# Patient Record
Sex: FEMALE | Race: WHITE | NOT HISPANIC OR LATINO | Employment: FULL TIME | URBAN - METROPOLITAN AREA
[De-identification: names, ages, dates, MRNs, and addresses within clinical notes are randomized per-mention and may not be internally consistent; named-entity substitution may affect disease eponyms.]

---

## 2019-05-09 PROBLEM — F41.8 DEPRESSION WITH ANXIETY: Status: ACTIVE | Noted: 2019-05-09

## 2019-05-09 PROBLEM — E66.9 CLASS 1 OBESITY WITHOUT SERIOUS COMORBIDITY WITH BODY MASS INDEX (BMI) OF 33.0 TO 33.9 IN ADULT: Status: ACTIVE | Noted: 2019-05-09

## 2019-05-09 PROBLEM — Z11.59 ENCOUNTER FOR HEPATITIS C SCREENING TEST FOR LOW RISK PATIENT: Status: ACTIVE | Noted: 2019-05-09

## 2019-05-09 PROBLEM — K21.9 GASTROESOPHAGEAL REFLUX DISEASE: Status: ACTIVE | Noted: 2019-05-09

## 2020-04-14 ENCOUNTER — TELEPHONE (OUTPATIENT)
Dept: ORTHOPEDICS | Facility: CLINIC | Age: 60
End: 2020-04-14

## 2021-05-12 ENCOUNTER — PATIENT MESSAGE (OUTPATIENT)
Dept: RESEARCH | Facility: HOSPITAL | Age: 61
End: 2021-05-12

## 2022-06-16 PROBLEM — E66.09 CLASS 1 OBESITY DUE TO EXCESS CALORIES WITH SERIOUS COMORBIDITY AND BODY MASS INDEX (BMI) OF 33.0 TO 33.9 IN ADULT: Status: ACTIVE | Noted: 2019-05-09

## 2022-06-16 PROBLEM — K31.84 GASTROPARESIS: Status: ACTIVE | Noted: 2022-06-16

## 2022-07-12 PROBLEM — R10.13 EPIGASTRIC PAIN: Status: ACTIVE | Noted: 2022-07-12

## 2022-09-21 ENCOUNTER — OFFICE VISIT (OUTPATIENT)
Dept: CARDIOLOGY | Facility: CLINIC | Age: 62
End: 2022-09-21
Payer: COMMERCIAL

## 2022-09-21 VITALS
SYSTOLIC BLOOD PRESSURE: 134 MMHG | DIASTOLIC BLOOD PRESSURE: 80 MMHG | BODY MASS INDEX: 33.88 KG/M2 | HEART RATE: 87 BPM | WEIGHT: 198.44 LBS | HEIGHT: 64 IN

## 2022-09-21 DIAGNOSIS — E78.00 HYPERCHOLESTEROLEMIA: Chronic | ICD-10-CM

## 2022-09-21 DIAGNOSIS — R09.89 RIGHT CAROTID BRUIT: ICD-10-CM

## 2022-09-21 DIAGNOSIS — Z82.49 FAMILY HISTORY OF ABDOMINAL AORTIC ANEURYSM (AAA): ICD-10-CM

## 2022-09-21 DIAGNOSIS — R01.1 SYSTOLIC MURMUR: ICD-10-CM

## 2022-09-21 DIAGNOSIS — E66.9 OBESITY, CLASS I, BMI 30-34.9: ICD-10-CM

## 2022-09-21 DIAGNOSIS — R06.09 DOE (DYSPNEA ON EXERTION): ICD-10-CM

## 2022-09-21 DIAGNOSIS — R07.2 PRECORDIAL PAIN: Primary | ICD-10-CM

## 2022-09-21 DIAGNOSIS — R53.83 OTHER FATIGUE: Chronic | ICD-10-CM

## 2022-09-21 DIAGNOSIS — Z82.49 FAMILY HISTORY OF EARLY CAD: Chronic | ICD-10-CM

## 2022-09-21 DIAGNOSIS — R03.0 ELEVATED BP WITHOUT DIAGNOSIS OF HYPERTENSION: Chronic | ICD-10-CM

## 2022-09-21 PROCEDURE — 3079F DIAST BP 80-89 MM HG: CPT | Mod: CPTII,S$GLB,, | Performed by: INTERNAL MEDICINE

## 2022-09-21 PROCEDURE — 93000 EKG 12-LEAD: ICD-10-PCS | Mod: S$GLB,,, | Performed by: INTERNAL MEDICINE

## 2022-09-21 PROCEDURE — 3008F BODY MASS INDEX DOCD: CPT | Mod: CPTII,S$GLB,, | Performed by: INTERNAL MEDICINE

## 2022-09-21 PROCEDURE — 93000 ELECTROCARDIOGRAM COMPLETE: CPT | Mod: S$GLB,,, | Performed by: INTERNAL MEDICINE

## 2022-09-21 PROCEDURE — 3044F HG A1C LEVEL LT 7.0%: CPT | Mod: CPTII,S$GLB,, | Performed by: INTERNAL MEDICINE

## 2022-09-21 PROCEDURE — 3079F PR MOST RECENT DIASTOLIC BLOOD PRESSURE 80-89 MM HG: ICD-10-PCS | Mod: CPTII,S$GLB,, | Performed by: INTERNAL MEDICINE

## 2022-09-21 PROCEDURE — 99204 PR OFFICE/OUTPT VISIT, NEW, LEVL IV, 45-59 MIN: ICD-10-PCS | Mod: S$GLB,,, | Performed by: INTERNAL MEDICINE

## 2022-09-21 PROCEDURE — 1159F PR MEDICATION LIST DOCUMENTED IN MEDICAL RECORD: ICD-10-PCS | Mod: CPTII,S$GLB,, | Performed by: INTERNAL MEDICINE

## 2022-09-21 PROCEDURE — 99204 OFFICE O/P NEW MOD 45 MIN: CPT | Mod: S$GLB,,, | Performed by: INTERNAL MEDICINE

## 2022-09-21 PROCEDURE — 3044F PR MOST RECENT HEMOGLOBIN A1C LEVEL <7.0%: ICD-10-PCS | Mod: CPTII,S$GLB,, | Performed by: INTERNAL MEDICINE

## 2022-09-21 PROCEDURE — 3075F PR MOST RECENT SYSTOLIC BLOOD PRESS GE 130-139MM HG: ICD-10-PCS | Mod: CPTII,S$GLB,, | Performed by: INTERNAL MEDICINE

## 2022-09-21 PROCEDURE — 3075F SYST BP GE 130 - 139MM HG: CPT | Mod: CPTII,S$GLB,, | Performed by: INTERNAL MEDICINE

## 2022-09-21 PROCEDURE — 1159F MED LIST DOCD IN RCRD: CPT | Mod: CPTII,S$GLB,, | Performed by: INTERNAL MEDICINE

## 2022-09-21 PROCEDURE — 3008F PR BODY MASS INDEX (BMI) DOCUMENTED: ICD-10-PCS | Mod: CPTII,S$GLB,, | Performed by: INTERNAL MEDICINE

## 2022-09-21 RX ORDER — PRAVASTATIN SODIUM 10 MG/1
10 TABLET ORAL DAILY
Qty: 90 TABLET | Refills: 0 | Status: SHIPPED | OUTPATIENT
Start: 2022-09-21 | End: 2022-12-14

## 2022-09-21 RX ORDER — PANTOPRAZOLE SODIUM 40 MG/1
TABLET, DELAYED RELEASE ORAL
COMMUNITY
Start: 2022-07-12 | End: 2023-06-01 | Stop reason: SDUPTHER

## 2022-09-21 NOTE — PROGRESS NOTES
Subjective:    Patient ID:  Vivi Dawson is a 62 y.o. female who presents for Heartburn and Chest Pain (Sting feeling in back left side)        Heartburn  She complains of chest pain (WITH STRESS). She reports no abdominal pain, no coughing, no dysphagia, no heartburn (HH), no nausea or no wheezing. Pertinent negatives include no melena.   Chest Pain   Associated symptoms include malaise/fatigue. Pertinent negatives include no abdominal pain, back pain, claudication, cough, diaphoresis, dizziness, fever, hemoptysis, irregular heartbeat, nausea, near-syncope, numbness, orthopnea, palpitations, PND, shortness of breath, syncope, vomiting or weakness.   NP EVALUATION, CP, YOUNGER BROTHER ATHLETE,  SUDDENLY  AT 57, , AUTOPSY EXTENSIVE CAD, OLDER BROTHER AAA, H/O HYPERCHOLESTEROLEMIA, ALSO DURING BLOOD DOMNATION LAST MONTH, SEE TAYLOR H/O HTN, , HBA1C/TSH  OK, SEE ROS    Past Medical History:   Diagnosis Date    Depression with anxiety 2019    Depression with anxiety     Gastroesophageal reflux disease 2019    Hypercholesterolemia 2022    Systolic murmur 2022     Past Surgical History:   Procedure Laterality Date    ANKLE FRACTURE SURGERY Right 2007    ORIF right ankle    CYST REMOVAL Right 2019    cyst removed right thigh    ESOPHAGOGASTRODUODENOSCOPY N/A 2022    Procedure: EGD (ESOPHAGOGASTRODUODENOSCOPY);  Surgeon: KB Tellez MD;  Location: [facility];  Service: Endoscopy;  Laterality: N/A;     Family History   Problem Relation Age of Onset    Cancer Father      Social History     Socioeconomic History    Marital status:    Tobacco Use    Smoking status: Never    Smokeless tobacco: Never   Substance and Sexual Activity    Alcohol use: Yes     Alcohol/week: 1.0 standard drink     Types: 1 Glasses of wine per week    Drug use: No    Sexual activity: Yes     Birth control/protection: Post-menopausal     Social Determinants of Health     Financial Resource Strain:  Low Risk     Difficulty of Paying Living Expenses: Not very hard   Food Insecurity: No Food Insecurity    Worried About Running Out of Food in the Last Year: Never true    Ran Out of Food in the Last Year: Never true   Transportation Needs: No Transportation Needs    Lack of Transportation (Medical): No    Lack of Transportation (Non-Medical): No   Physical Activity: Unknown    Days of Exercise per Week: 0 days    Minutes of Exercise per Session: Patient refused   Stress: Stress Concern Present    Feeling of Stress : Rather much   Social Connections: Unknown    Frequency of Communication with Friends and Family: More than three times a week    Frequency of Social Gatherings with Friends and Family: Once a week    Active Member of Clubs or Organizations: Yes    Attends Club or Organization Meetings: More than 4 times per year    Marital Status:    Housing Stability: Low Risk     Unable to Pay for Housing in the Last Year: No    Number of Places Lived in the Last Year: 1    Unstable Housing in the Last Year: No       Review of patient's allergies indicates:   Allergen Reactions    Codeine Nausea And Vomiting       Current Outpatient Medications:     meloxicam (MOBIC) 15 MG tablet, TAKE 1 TABLET BY MOUTH EVERY DAY, Disp: 30 tablet, Rfl: 1    metoclopramide HCl (REGLAN) 5 MG tablet, Take 1 tablet (5 mg total) by mouth 2 (two) times daily as needed (gastroparesis)., Disp: 12 tablet, Rfl: 0    ondansetron (ZOFRAN-ODT) 8 MG TbDL, Take 1 tablet (8 mg total) by mouth every 24 hours as needed (nausea)., Disp: 20 tablet, Rfl: 0    pantoprazole (PROTONIX) 40 MG tablet, TAKE 1 TABLET BY MOUTH TWICE A DAY BEFORE BREAKFAST & DINNER, Disp: , Rfl:     pravastatin (PRAVACHOL) 10 MG tablet, Take 1 tablet (10 mg total) by mouth once daily., Disp: 90 tablet, Rfl: 0    Review of Systems   Constitutional: Positive for malaise/fatigue and weight gain. Negative for chills, diaphoresis, fever and night sweats.   HENT:  Positive for  "hearing loss. Negative for congestion and nosebleeds.    Eyes:  Negative for blurred vision and visual disturbance.   Cardiovascular:  Positive for chest pain (WITH STRESS) and dyspnea on exertion (MILD). Negative for claudication, cyanosis, irregular heartbeat, leg swelling (OCC ANKLES), near-syncope, orthopnea, palpitations, paroxysmal nocturnal dyspnea and syncope.   Respiratory:  Negative for cough, hemoptysis, shortness of breath and wheezing.    Endocrine: Positive for polydipsia. Negative for heat intolerance and polyuria.   Hematologic/Lymphatic: Negative for adenopathy. Does not bruise/bleed easily.   Skin:  Negative for color change, itching and nail changes.   Musculoskeletal:  Positive for joint pain (KNEES). Negative for back pain and falls.   Gastrointestinal:  Negative for abdominal pain, change in bowel habit, dysphagia, heartburn (HH), jaundice, melena, nausea and vomiting.   Genitourinary:  Negative for dysuria and flank pain.   Neurological:  Negative for brief paralysis, dizziness, focal weakness, light-headedness, loss of balance, numbness, paresthesias, tremors and weakness.   Psychiatric/Behavioral:  Negative for altered mental status, depression and memory loss. The patient has insomnia (STARTED HORMAONAL CREAM) and is nervous/anxious.    Allergic/Immunologic: Negative for hives and persistent infections.      Objective:      Vitals:    09/21/22 1518 09/21/22 1537   BP: (!) 175/92 134/80   Pulse: 87    Weight: 90 kg (198 lb 6.6 oz)    Height: 5' 4" (1.626 m)    PainSc:   2    PainLoc: Chest      Body mass index is 34.06 kg/m².    Physical Exam  Constitutional:       Appearance: She is well-developed. She is obese.   HENT:      Head: Normocephalic and atraumatic.   Eyes:      Conjunctiva/sclera: Conjunctivae normal.      Pupils: Pupils are equal, round, and reactive to light.   Neck:      Thyroid: No thyromegaly.      Vascular: Normal carotid pulses. Carotid bruit present. No hepatojugular " reflux or JVD.      Trachea: Trachea normal. No tracheal deviation.   Cardiovascular:      Rate and Rhythm: Normal rate and regular rhythm. No extrasystoles are present.     Chest Wall: PMI is not displaced.      Pulses:           Carotid pulses are 2+ on the right side with bruit and 2+ on the left side.       Radial pulses are 2+ on the right side and 2+ on the left side.        Femoral pulses are 2+ on the right side and 2+ on the left side.       Dorsalis pedis pulses are 2+ on the right side and 2+ on the left side.        Posterior tibial pulses are 2+ on the right side and 2+ on the left side.      Heart sounds: Murmur heard.   Systolic murmur is present with a grade of 2/6 at the lower left sternal border.     No friction rub. No gallop.   Pulmonary:      Effort: Pulmonary effort is normal. No tachypnea or bradypnea.      Breath sounds: Normal breath sounds. No wheezing or rales.   Chest:      Chest wall: No tenderness.   Abdominal:      General: Bowel sounds are normal. There is no distension.      Palpations: Abdomen is soft. There is no mass.      Tenderness: There is no abdominal tenderness. There is no guarding.   Musculoskeletal:         General: Normal range of motion.      Cervical back: Neck supple. No erythema.      Right lower leg: No edema.      Left lower leg: No edema.   Skin:     General: Skin is warm and dry.      Capillary Refill: Capillary refill takes less than 2 seconds.      Coloration: Skin is not pale.      Findings: No erythema or rash.   Neurological:      General: No focal deficit present.      Mental Status: She is alert and oriented to person, place, and time.      Cranial Nerves: No cranial nerve deficit ([place]).      Motor: No tremor or abnormal muscle tone.      Coordination: Coordination normal.   Psychiatric:         Speech: Speech normal.         Behavior: Behavior normal.         Thought Content: Thought content normal.         Judgment: Judgment normal.               ..     Chemistry        Component Value Date/Time     06/16/2022 1025    K 4.2 06/16/2022 1025     06/16/2022 1025    CO2 28 06/16/2022 1025    BUN 13 06/16/2022 1025    CREATININE 0.71 06/16/2022 1025    GLU 92 06/16/2022 1025        Component Value Date/Time    CALCIUM 9.0 06/16/2022 1025    ALKPHOS 89 06/16/2022 1025    AST 28 06/16/2022 1025    ALT 23 06/16/2022 1025    BILITOT 0.3 06/16/2022 1025    ESTGFRAFRICA >60 06/16/2022 1025    EGFRNONAA >60 06/16/2022 1025            ..  Lab Results   Component Value Date    CHOL 190 06/16/2022    CHOL 181 08/24/2020    CHOL 209 (H) 05/09/2019     Lab Results   Component Value Date    HDL 51 06/16/2022    HDL 59 08/24/2020    HDL 51 05/09/2019     Lab Results   Component Value Date    LDLCALC 124.2 06/16/2022    LDLCALC 109.4 08/24/2020    LDLCALC 139.2 05/09/2019     Lab Results   Component Value Date    TRIG 74 06/16/2022    TRIG 63 08/24/2020    TRIG 94 05/09/2019     Lab Results   Component Value Date    CHOLHDL 26.8 06/16/2022    CHOLHDL 32.6 08/24/2020    CHOLHDL 24.4 05/09/2019     ..  Lab Results   Component Value Date    WBC 4.93 08/24/2020    HGB 13.8 08/24/2020    HCT 41.8 08/24/2020    MCV 89 08/24/2020     08/24/2020       Test(s) Reviewed  I have reviewed the following in detail:  [] Stress test   [] Angiography   [] Echocardiogram   [x] Labs   [x] Other:       Assessment:         ICD-10-CM ICD-9-CM   1. Precordial pain  R07.2 786.51   2. MCKEON (dyspnea on exertion)  R06.00 786.09   3. Systolic murmur  R01.1 785.2   4. Hypercholesterolemia  E78.00 272.0   5. Family history of early CAD  Z82.49 V17.3   6. Elevated BP without diagnosis of hypertension  R03.0 796.2   7. Family history of abdominal aortic aneurysm (AAA)  Z82.49 V17.49   8. Other fatigue  R53.83 780.79   9. Right carotid bruit  R09.89 785.9     Problem List Items Addressed This Visit          Cardiac/Vascular    Precordial pain - Primary    Relevant Orders    Stress Echo Which stress  agent will be used? Treadmill Exercise; Color Flow Doppler? Yes    Hypercholesterolemia    Family history of early CAD    Relevant Orders    Stress Echo Which stress agent will be used? Treadmill Exercise; Color Flow Doppler? Yes    Systolic murmur    Relevant Orders    Stress Echo Which stress agent will be used? Treadmill Exercise; Color Flow Doppler? Yes    Elevated BP without diagnosis of hypertension    Family history of abdominal aortic aneurysm (AAA)    MCKEON (dyspnea on exertion)    Relevant Orders    Stress Echo Which stress agent will be used? Treadmill Exercise; Color Flow Doppler? Yes    Right carotid bruit    Relevant Orders    CV Ultrasound Bilateral Doppler Carotid       Other    Other fatigue        Plan:     EKG WNL, WILL NEED FURTHER EVALUATION CHECK STRESS ECHO SE, CAROTIDS, WATCH BP,START PRAVACHOL IN THIS PATIENT WITH MULTIPLE RISK FACTORS, ALL CV CLINICALLY STABLE, ASSESS EQUIVALENT ANGINA, NO HF, NO TIA, NO CLINICAL ARRHYTHMIA,CONTINUE CURRENT MEDS, EDUCATION, DIET, EXERCISE, WEIGHT LOSS RETURN TO CLINIC IN FEW WEEKS AFTER TESTS      Precordial pain  Comments:  EVALUATE  Orders:  -     Stress Echo Which stress agent will be used? Treadmill Exercise; Color Flow Doppler? Yes; Future    MCKEON (dyspnea on exertion)  -     Stress Echo Which stress agent will be used? Treadmill Exercise; Color Flow Doppler? Yes; Future  -     IN OFFICE EKG 12-LEAD (to Muse)    Systolic murmur  -     Stress Echo Which stress agent will be used? Treadmill Exercise; Color Flow Doppler? Yes; Future    Hypercholesterolemia    Family history of early CAD  -     Stress Echo Which stress agent will be used? Treadmill Exercise; Color Flow Doppler? Yes; Future    Elevated BP without diagnosis of hypertension    Family history of abdominal aortic aneurysm (AAA)    Other fatigue    Right carotid bruit  Comments:  CAROTID ULTRASOUND  Orders:  -     CV Ultrasound Bilateral Doppler Carotid; Future    Other orders  -     pravastatin  (PRAVACHOL) 10 MG tablet; Take 1 tablet (10 mg total) by mouth once daily.  Dispense: 90 tablet; Refill: 0  RTC Low level/low impact aerobic exercise 5x's/wk. Heart healthy diet and risk factor modification.    See labs and med orders.    Aerobic exercise 5x's/wk. Heart healthy diet and risk factor modification.    See labs and med orders.

## 2022-09-22 DIAGNOSIS — R06.09 DOE (DYSPNEA ON EXERTION): Primary | ICD-10-CM

## 2022-09-22 PROBLEM — E66.9 OBESITY, CLASS I, BMI 30-34.9: Status: ACTIVE | Noted: 2022-09-22

## 2022-10-05 ENCOUNTER — CLINICAL SUPPORT (OUTPATIENT)
Dept: CARDIOLOGY | Facility: HOSPITAL | Age: 62
End: 2022-10-05
Attending: INTERNAL MEDICINE
Payer: COMMERCIAL

## 2022-10-05 VITALS
DIASTOLIC BLOOD PRESSURE: 92 MMHG | WEIGHT: 198 LBS | SYSTOLIC BLOOD PRESSURE: 171 MMHG | HEIGHT: 64 IN | BODY MASS INDEX: 33.8 KG/M2

## 2022-10-05 DIAGNOSIS — R09.89 RIGHT CAROTID BRUIT: ICD-10-CM

## 2022-10-05 DIAGNOSIS — R06.09 DOE (DYSPNEA ON EXERTION): ICD-10-CM

## 2022-10-05 DIAGNOSIS — Z82.49 FAMILY HISTORY OF EARLY CAD: ICD-10-CM

## 2022-10-05 DIAGNOSIS — R07.2 PRECORDIAL PAIN: ICD-10-CM

## 2022-10-05 DIAGNOSIS — R01.1 SYSTOLIC MURMUR: ICD-10-CM

## 2022-10-05 LAB
ASCENDING AORTA: 2.71 CM
AV INDEX (PROSTH): 0.74
AV MEAN GRADIENT: 7 MMHG
AV PEAK GRADIENT: 13 MMHG
AV VALVE AREA: 2.04 CM2
AV VELOCITY RATIO: 0.7
BSA FOR ECHO PROCEDURE: 2.01 M2
CV ECHO LV RWT: 0.38 CM
CV STRESS BASE HR: 91 BPM
DIASTOLIC BLOOD PRESSURE: 92 MMHG
DOP CALC AO PEAK VEL: 1.78 M/S
DOP CALC AO VTI: 37.6 CM
DOP CALC LVOT AREA: 2.7 CM2
DOP CALC LVOT DIAMETER: 1.87 CM
DOP CALC LVOT PEAK VEL: 1.24 M/S
DOP CALC LVOT STROKE VOLUME: 76.86 CM3
DOP CALCLVOT PEAK VEL VTI: 28 CM
E WAVE DECELERATION TIME: 195.4 MSEC
E/A RATIO: 1.27
E/E' RATIO: 15.57 M/S
ECHO LV POSTERIOR WALL: 0.88 CM (ref 0.6–1.1)
EJECTION FRACTION: 65 %
FRACTIONAL SHORTENING: 39 % (ref 28–44)
INTERVENTRICULAR SEPTUM: 0.82 CM (ref 0.6–1.1)
IVRT: 79.92 MSEC
LA MAJOR: 4.62 CM
LA MINOR: 4.28 CM
LA WIDTH: 3.9 CM
LEFT ARM DIASTOLIC BLOOD PRESSURE: 92 MMHG
LEFT ARM SYSTOLIC BLOOD PRESSURE: 171 MMHG
LEFT ATRIUM SIZE: 3.29 CM
LEFT ATRIUM VOLUME INDEX: 24.9 ML/M2
LEFT ATRIUM VOLUME: 48.46 CM3
LEFT CBA DIAS: 21 CM/S
LEFT CBA SYS: 64 CM/S
LEFT CCA DIST DIAS: 24 CM/S
LEFT CCA DIST SYS: 88 CM/S
LEFT CCA MID DIAS: 28 CM/S
LEFT CCA MID SYS: 108 CM/S
LEFT CCA PROX DIAS: 22 CM/S
LEFT CCA PROX SYS: 98 CM/S
LEFT ECA DIAS: 18 CM/S
LEFT ECA SYS: 90 CM/S
LEFT ICA DIST DIAS: 43 CM/S
LEFT ICA DIST SYS: 93 CM/S
LEFT ICA MID DIAS: 32 CM/S
LEFT ICA MID SYS: 84 CM/S
LEFT ICA PROX DIAS: 28 CM/S
LEFT ICA PROX SYS: 71 CM/S
LEFT INTERNAL DIMENSION IN SYSTOLE: 2.87 CM (ref 2.1–4)
LEFT VENTRICLE DIASTOLIC VOLUME INDEX: 52.26 ML/M2
LEFT VENTRICLE DIASTOLIC VOLUME: 101.91 ML
LEFT VENTRICLE MASS INDEX: 68 G/M2
LEFT VENTRICLE SYSTOLIC VOLUME INDEX: 16.1 ML/M2
LEFT VENTRICLE SYSTOLIC VOLUME: 31.45 ML
LEFT VENTRICULAR INTERNAL DIMENSION IN DIASTOLE: 4.69 CM (ref 3.5–6)
LEFT VENTRICULAR MASS: 131.85 G
LEFT VERTEBRAL DIAS: 13 CM/S
LEFT VERTEBRAL SYS: 45 CM/S
LV LATERAL E/E' RATIO: 15.57 M/S
LV SEPTAL E/E' RATIO: 15.57 M/S
LVOT MG: 3.38 MMHG
LVOT MV: 0.85 CM/S
MV PEAK A VEL: 0.86 M/S
MV PEAK E VEL: 1.09 M/S
MV STENOSIS PRESSURE HALF TIME: 56.67 MS
MV VALVE AREA P 1/2 METHOD: 3.88 CM2
OHS CV CAROTID RIGHT ICA EDV HIGHEST: 39
OHS CV CAROTID ULTRASOUND LEFT ICA/CCA RATIO: 1.06
OHS CV CAROTID ULTRASOUND RIGHT ICA/CCA RATIO: 0.94
OHS CV CPX 1 MINUTE RECOVERY HEART RATE: 120 BPM
OHS CV CPX 85 PERCENT MAX PREDICTED HEART RATE MALE: 130
OHS CV CPX ESTIMATED METS: 9
OHS CV CPX MAX PREDICTED HEART RATE: 153
OHS CV CPX PATIENT IS FEMALE: 1
OHS CV CPX PATIENT IS MALE: 0
OHS CV CPX PEAK DIASTOLIC BLOOD PRESSURE: 86 MMHG
OHS CV CPX PEAK HEAR RATE: 160 BPM
OHS CV CPX PEAK RATE PRESSURE PRODUCT: NORMAL
OHS CV CPX PEAK SYSTOLIC BLOOD PRESSURE: 211 MMHG
OHS CV CPX PERCENT MAX PREDICTED HEART RATE ACHIEVED: 104
OHS CV CPX RATE PRESSURE PRODUCT PRESENTING: NORMAL
OHS CV PV CAROTID LEFT HIGHEST CCA: 108
OHS CV PV CAROTID LEFT HIGHEST ICA: 93
OHS CV PV CAROTID RIGHT HIGHEST CCA: 113
OHS CV PV CAROTID RIGHT HIGHEST ICA: 99
OHS CV US CAROTID LEFT HIGHEST EDV: 43
PISA TR MAX VEL: 2.39 M/S
PULM VEIN S/D RATIO: 1.21
PV PEAK D VEL: 0.58 M/S
PV PEAK S VEL: 0.7 M/S
RA MAJOR: 3.91 CM
RA PRESSURE: 3 MMHG
RA WIDTH: 2.91 CM
RIGHT ARM DIASTOLIC BLOOD PRESSURE: 92 MMHG
RIGHT ARM SYSTOLIC BLOOD PRESSURE: 171 MMHG
RIGHT CBA DIAS: 18 CM/S
RIGHT CBA SYS: 68 CM/S
RIGHT CCA DIST DIAS: 31 CM/S
RIGHT CCA DIST SYS: 105 CM/S
RIGHT CCA MID DIAS: 33 CM/S
RIGHT CCA MID SYS: 113 CM/S
RIGHT CCA PROX DIAS: 20 CM/S
RIGHT CCA PROX SYS: 90 CM/S
RIGHT ECA DIAS: 24 CM/S
RIGHT ECA SYS: 121 CM/S
RIGHT ICA DIST DIAS: 39 CM/S
RIGHT ICA DIST SYS: 99 CM/S
RIGHT ICA MID DIAS: 28 CM/S
RIGHT ICA MID SYS: 74 CM/S
RIGHT ICA PROX DIAS: 23 CM/S
RIGHT ICA PROX SYS: 67 CM/S
RIGHT VENTRICULAR END-DIASTOLIC DIMENSION: 3.4 CM
RIGHT VENTRICULAR LENGTH IN DIASTOLE (APICAL 4-CHAMBER VIEW): 6.22 CM
RIGHT VERTEBRAL DIAS: 16 CM/S
RIGHT VERTEBRAL SYS: 49 CM/S
RV MID DIAMA: 2.23 CM
RV TISSUE DOPPLER FREE WALL SYSTOLIC VELOCITY 1 (APICAL 4 CHAMBER VIEW): 0.02 CM/S
SINUS: 2.79 CM
STJ: 2.46 CM
STRESS ECHO POST EXERCISE DUR MIN: 5 MINUTES
STRESS ECHO POST EXERCISE DUR SEC: 9 SECONDS
SYSTOLIC BLOOD PRESSURE: 171 MMHG
TDI LATERAL: 0.07 M/S
TDI SEPTAL: 0.07 M/S
TDI: 0.07 M/S
TR MAX PG: 23 MMHG
TRICUSPID ANNULAR PLANE SYSTOLIC EXCURSION: 2.45 CM
TV REST PULMONARY ARTERY PRESSURE: 26 MMHG

## 2022-10-05 PROCEDURE — 93351 STRESS TTE COMPLETE: CPT | Mod: PO

## 2022-10-05 PROCEDURE — 93325 DOPPLER ECHO COLOR FLOW MAPG: CPT | Mod: 26,,, | Performed by: INTERNAL MEDICINE

## 2022-10-05 PROCEDURE — 93320 DOPPLER ECHO COMPLETE: CPT | Mod: 26,,, | Performed by: INTERNAL MEDICINE

## 2022-10-05 PROCEDURE — 93880 EXTRACRANIAL BILAT STUDY: CPT | Mod: 26,,, | Performed by: INTERNAL MEDICINE

## 2022-10-05 PROCEDURE — 93320 STRESS ECHO (CUPID ONLY): ICD-10-PCS | Mod: 26,,, | Performed by: INTERNAL MEDICINE

## 2022-10-05 PROCEDURE — 93325 STRESS ECHO (CUPID ONLY): ICD-10-PCS | Mod: 26,,, | Performed by: INTERNAL MEDICINE

## 2022-10-05 PROCEDURE — 93880 EXTRACRANIAL BILAT STUDY: CPT | Mod: PO

## 2022-10-05 PROCEDURE — 93351 STRESS TTE COMPLETE: CPT | Mod: 26,,, | Performed by: INTERNAL MEDICINE

## 2022-10-05 PROCEDURE — 93880 CV US DOPPLER CAROTID (CUPID ONLY): ICD-10-PCS | Mod: 26,,, | Performed by: INTERNAL MEDICINE

## 2022-10-05 PROCEDURE — 93351 STRESS ECHO (CUPID ONLY): ICD-10-PCS | Mod: 26,,, | Performed by: INTERNAL MEDICINE

## 2022-11-09 ENCOUNTER — OFFICE VISIT (OUTPATIENT)
Dept: CARDIOLOGY | Facility: CLINIC | Age: 62
End: 2022-11-09
Payer: COMMERCIAL

## 2022-11-09 VITALS
BODY MASS INDEX: 33.88 KG/M2 | HEART RATE: 73 BPM | WEIGHT: 198.44 LBS | DIASTOLIC BLOOD PRESSURE: 76 MMHG | HEIGHT: 64 IN | SYSTOLIC BLOOD PRESSURE: 136 MMHG

## 2022-11-09 DIAGNOSIS — E78.00 HYPERCHOLESTEROLEMIA: Primary | Chronic | ICD-10-CM

## 2022-11-09 DIAGNOSIS — I34.0 NONRHEUMATIC MITRAL VALVE REGURGITATION: ICD-10-CM

## 2022-11-09 DIAGNOSIS — E66.9 OBESITY, CLASS I, BMI 30-34.9: Chronic | ICD-10-CM

## 2022-11-09 DIAGNOSIS — R03.0 ELEVATED BP WITHOUT DIAGNOSIS OF HYPERTENSION: Chronic | ICD-10-CM

## 2022-11-09 PROCEDURE — 3008F BODY MASS INDEX DOCD: CPT | Mod: CPTII,S$GLB,, | Performed by: INTERNAL MEDICINE

## 2022-11-09 PROCEDURE — 3078F DIAST BP <80 MM HG: CPT | Mod: CPTII,S$GLB,, | Performed by: INTERNAL MEDICINE

## 2022-11-09 PROCEDURE — 3075F SYST BP GE 130 - 139MM HG: CPT | Mod: CPTII,S$GLB,, | Performed by: INTERNAL MEDICINE

## 2022-11-09 PROCEDURE — 3044F HG A1C LEVEL LT 7.0%: CPT | Mod: CPTII,S$GLB,, | Performed by: INTERNAL MEDICINE

## 2022-11-09 PROCEDURE — 3075F PR MOST RECENT SYSTOLIC BLOOD PRESS GE 130-139MM HG: ICD-10-PCS | Mod: CPTII,S$GLB,, | Performed by: INTERNAL MEDICINE

## 2022-11-09 PROCEDURE — 3044F PR MOST RECENT HEMOGLOBIN A1C LEVEL <7.0%: ICD-10-PCS | Mod: CPTII,S$GLB,, | Performed by: INTERNAL MEDICINE

## 2022-11-09 PROCEDURE — 99214 OFFICE O/P EST MOD 30 MIN: CPT | Mod: S$GLB,,, | Performed by: INTERNAL MEDICINE

## 2022-11-09 PROCEDURE — 3008F PR BODY MASS INDEX (BMI) DOCUMENTED: ICD-10-PCS | Mod: CPTII,S$GLB,, | Performed by: INTERNAL MEDICINE

## 2022-11-09 PROCEDURE — 1159F MED LIST DOCD IN RCRD: CPT | Mod: CPTII,S$GLB,, | Performed by: INTERNAL MEDICINE

## 2022-11-09 PROCEDURE — 1159F PR MEDICATION LIST DOCUMENTED IN MEDICAL RECORD: ICD-10-PCS | Mod: CPTII,S$GLB,, | Performed by: INTERNAL MEDICINE

## 2022-11-09 PROCEDURE — 3078F PR MOST RECENT DIASTOLIC BLOOD PRESSURE < 80 MM HG: ICD-10-PCS | Mod: CPTII,S$GLB,, | Performed by: INTERNAL MEDICINE

## 2022-11-09 PROCEDURE — 99214 PR OFFICE/OUTPT VISIT, EST, LEVL IV, 30-39 MIN: ICD-10-PCS | Mod: S$GLB,,, | Performed by: INTERNAL MEDICINE

## 2022-11-09 NOTE — PROGRESS NOTES
Subjective:    Patient ID:  Vivi Dawson is a 62 y.o. female who presents for Follow-up and Hyperlipidemia        HPI  DISCUSSED TESTS NORMAL CAROTID ULTRASOUND, NORMAL LV FUNCTION WITH NEGATIVE STRESS ECHO MILD MR, DOING OK, FATIGUE, BETTER WITH HORMONE CREAM WAS SLEEPING TIRED, BP OK, SEE ROS    Past Medical History:   Diagnosis Date    Depression with anxiety 5/9/2019    Depression with anxiety     Gastroesophageal reflux disease 5/9/2019    Hypercholesterolemia 9/21/2022    Systolic murmur 9/21/2022     Past Surgical History:   Procedure Laterality Date    ANKLE FRACTURE SURGERY Right 12/26/2007    ORIF right ankle    CYST REMOVAL Right 07/2019    cyst removed right thigh    ESOPHAGOGASTRODUODENOSCOPY N/A 7/12/2022    Procedure: EGD (ESOPHAGOGASTRODUODENOSCOPY);  Surgeon: KB Tellez MD;  Location: [facility];  Service: Endoscopy;  Laterality: N/A;     Family History   Problem Relation Age of Onset    Cancer Father      Social History     Socioeconomic History    Marital status:    Tobacco Use    Smoking status: Never    Smokeless tobacco: Never   Substance and Sexual Activity    Alcohol use: Yes     Alcohol/week: 1.0 standard drink     Types: 1 Glasses of wine per week    Drug use: No    Sexual activity: Yes     Birth control/protection: Post-menopausal     Social Determinants of Health     Financial Resource Strain: Low Risk     Difficulty of Paying Living Expenses: Not very hard   Food Insecurity: No Food Insecurity    Worried About Running Out of Food in the Last Year: Never true    Ran Out of Food in the Last Year: Never true   Transportation Needs: No Transportation Needs    Lack of Transportation (Medical): No    Lack of Transportation (Non-Medical): No   Physical Activity: Inactive    Days of Exercise per Week: 0 days    Minutes of Exercise per Session: 0 min   Stress: Stress Concern Present    Feeling of Stress : To some extent   Social Connections: Unknown    Frequency of Communication  with Friends and Family: Twice a week    Frequency of Social Gatherings with Friends and Family: Once a week    Active Member of Clubs or Organizations: Yes    Attends Club or Organization Meetings: More than 4 times per year    Marital Status:    Housing Stability: Low Risk     Unable to Pay for Housing in the Last Year: No    Number of Places Lived in the Last Year: 1    Unstable Housing in the Last Year: No       Review of patient's allergies indicates:   Allergen Reactions    Codeine Nausea And Vomiting       Current Outpatient Medications:     meloxicam (MOBIC) 15 MG tablet, TAKE 1 TABLET BY MOUTH EVERY DAY, Disp: 30 tablet, Rfl: 1    ondansetron (ZOFRAN-ODT) 8 MG TbDL, Take 1 tablet (8 mg total) by mouth every 24 hours as needed (nausea)., Disp: 20 tablet, Rfl: 0    pantoprazole (PROTONIX) 40 MG tablet, TAKE 1 TABLET BY MOUTH TWICE A DAY BEFORE BREAKFAST & DINNER, Disp: , Rfl:     pravastatin (PRAVACHOL) 10 MG tablet, Take 1 tablet (10 mg total) by mouth once daily., Disp: 90 tablet, Rfl: 0    Review of Systems   Constitutional: Positive for malaise/fatigue. Negative for chills, diaphoresis, fever, night sweats and weight gain.   HENT:  Negative for congestion and nosebleeds.    Eyes:  Negative for blurred vision and visual disturbance.   Cardiovascular:  Negative for chest pain, claudication, cyanosis, dyspnea on exertion (MINIMAL), irregular heartbeat, leg swelling, near-syncope, orthopnea, palpitations, paroxysmal nocturnal dyspnea and syncope.   Respiratory:  Negative for cough, hemoptysis, shortness of breath and wheezing.    Endocrine: Negative for polydipsia and polyuria.   Hematologic/Lymphatic: Negative for adenopathy. Does not bruise/bleed easily.   Skin:  Negative for color change and itching.   Musculoskeletal:  Negative for back pain, falls and joint pain (KNEES).   Gastrointestinal:  Negative for abdominal pain, change in bowel habit, dysphagia, heartburn (HH), melena, nausea and vomiting.  "  Genitourinary:  Negative for dysuria and flank pain.   Neurological:  Negative for brief paralysis, dizziness, focal weakness, light-headedness, loss of balance and weakness.   Psychiatric/Behavioral:  Negative for altered mental status, depression and memory loss. The patient has insomnia.       Objective:      Vitals:    11/09/22 1135 11/09/22 1146   BP: (!) 140/85 136/76   Pulse: 73    Weight: 90 kg (198 lb 6.6 oz)    Height: 5' 4" (1.626 m)    PainSc: 0-No pain      Body mass index is 34.06 kg/m².    Physical Exam  Constitutional:       Appearance: She is well-developed. She is obese.   HENT:      Head: Normocephalic and atraumatic.   Eyes:      General: No scleral icterus.     Extraocular Movements: Extraocular movements intact.   Neck:      Vascular: Normal carotid pulses. No JVD.   Cardiovascular:      Rate and Rhythm: Normal rate and regular rhythm. No extrasystoles are present.     Chest Wall: PMI is not displaced.      Pulses:           Carotid pulses are 2+ on the right side and 2+ on the left side.       Radial pulses are 2+ on the right side and 2+ on the left side.        Posterior tibial pulses are 2+ on the right side and 2+ on the left side.      Heart sounds: Murmur heard.   Systolic murmur is present with a grade of 2/6 at the lower left sternal border.     No friction rub. No gallop.   Pulmonary:      Effort: Pulmonary effort is normal. No tachypnea or bradypnea.      Breath sounds: Normal breath sounds. No rales.   Abdominal:      General: Bowel sounds are normal.      Palpations: Abdomen is soft.      Tenderness: There is no abdominal tenderness.   Musculoskeletal:         General: Normal range of motion.      Cervical back: Neck supple.      Right lower leg: No edema.      Left lower leg: No edema.   Skin:     General: Skin is warm and dry.      Capillary Refill: Capillary refill takes less than 2 seconds.   Neurological:      General: No focal deficit present.      Mental Status: She is " alert and oriented to person, place, and time.      Motor: No tremor or abnormal muscle tone.   Psychiatric:         Mood and Affect: Mood normal.         Speech: Speech normal.         Behavior: Behavior normal.               ..    Chemistry        Component Value Date/Time     06/16/2022 1025    K 4.2 06/16/2022 1025     06/16/2022 1025    CO2 28 06/16/2022 1025    BUN 13 06/16/2022 1025    CREATININE 0.71 06/16/2022 1025    GLU 92 06/16/2022 1025        Component Value Date/Time    CALCIUM 9.0 06/16/2022 1025    ALKPHOS 89 06/16/2022 1025    AST 28 06/16/2022 1025    ALT 23 06/16/2022 1025    BILITOT 0.3 06/16/2022 1025    ESTGFRAFRICA >60 06/16/2022 1025    EGFRNONAA >60 06/16/2022 1025            ..  Lab Results   Component Value Date    CHOL 190 06/16/2022    CHOL 181 08/24/2020    CHOL 209 (H) 05/09/2019     Lab Results   Component Value Date    HDL 51 06/16/2022    HDL 59 08/24/2020    HDL 51 05/09/2019     Lab Results   Component Value Date    LDLCALC 124.2 06/16/2022    LDLCALC 109.4 08/24/2020    LDLCALC 139.2 05/09/2019     Lab Results   Component Value Date    TRIG 74 06/16/2022    TRIG 63 08/24/2020    TRIG 94 05/09/2019     Lab Results   Component Value Date    CHOLHDL 26.8 06/16/2022    CHOLHDL 32.6 08/24/2020    CHOLHDL 24.4 05/09/2019     ..  Lab Results   Component Value Date    WBC 4.93 08/24/2020    HGB 13.8 08/24/2020    HCT 41.8 08/24/2020    MCV 89 08/24/2020     08/24/2020       Test(s) Reviewed  I have reviewed the following in detail:  [x] Stress test   [] Angiography   [x] Echocardiogram   [] Labs   [x] Other:       Assessment:         ICD-10-CM ICD-9-CM   1. Hypercholesterolemia  E78.00 272.0   2. Nonrheumatic mitral valve regurgitation  I34.0 424.0   3. Elevated BP without diagnosis of hypertension  R03.0 796.2   4. Obesity, Class I, BMI 30-34.9  E66.9 278.00     Problem List Items Addressed This Visit          Cardiac/Vascular    Hypercholesterolemia - Primary     Relevant Orders    Comprehensive Metabolic Panel    Lipid Panel    Elevated BP without diagnosis of hypertension    Relevant Orders    Comprehensive Metabolic Panel    Nonrheumatic mitral valve regurgitation       Endocrine    Obesity, Class I, BMI 30-34.9        Plan:     WATCH BP, AFTERLOAD REDUCTION,ALL CV CLINICALLY STABLE, NO ANGINA, NO HF, NO TIA, NO CLINICAL ARRHYTHMIA,CONTINUE CURRENT MEDS, EDUCATION, DIET, EXERCISE , WEIGHT LOSS , OKAY FOR OZEMPIC IF QUALIFIES DISCUSSED WITH THE PATIENT RETURN TO CLINIC IN 6 MO WITH LABS      Hypercholesterolemia  -     Comprehensive Metabolic Panel; Future; Expected date: 05/09/2023  -     Lipid Panel; Future; Expected date: 05/09/2023    Nonrheumatic mitral valve regurgitation  Comments:  MONITOR MILD    Elevated BP without diagnosis of hypertension  Comments:  MONITOR  Orders:  -     Comprehensive Metabolic Panel; Future; Expected date: 05/09/2023    Obesity, Class I, BMI 30-34.9  RTC Low level/low impact aerobic exercise 5x's/wk. Heart healthy diet and risk factor modification.    See labs and med orders.    Aerobic exercise 5x's/wk. Heart healthy diet and risk factor modification.    See labs and med orders.

## 2023-03-17 ENCOUNTER — PATIENT MESSAGE (OUTPATIENT)
Dept: RESEARCH | Facility: HOSPITAL | Age: 63
End: 2023-03-17
Payer: COMMERCIAL

## 2023-05-11 ENCOUNTER — LAB VISIT (OUTPATIENT)
Dept: PRIMARY CARE CLINIC | Facility: CLINIC | Age: 63
End: 2023-05-11
Payer: COMMERCIAL

## 2023-05-11 DIAGNOSIS — R03.0 ELEVATED BP WITHOUT DIAGNOSIS OF HYPERTENSION: Chronic | ICD-10-CM

## 2023-05-11 DIAGNOSIS — E78.00 HYPERCHOLESTEROLEMIA: Chronic | ICD-10-CM

## 2023-05-11 LAB
ALBUMIN SERPL BCP-MCNC: 4.1 G/DL (ref 3.5–5.2)
ALP SERPL-CCNC: 88 U/L (ref 55–135)
ALT SERPL W/O P-5'-P-CCNC: 15 U/L (ref 10–44)
ANION GAP SERPL CALC-SCNC: 12 MMOL/L (ref 8–16)
AST SERPL-CCNC: 16 U/L (ref 10–40)
BILIRUB SERPL-MCNC: 0.4 MG/DL (ref 0.1–1)
BUN SERPL-MCNC: 12 MG/DL (ref 6–20)
CALCIUM SERPL-MCNC: 9.8 MG/DL (ref 8.7–10.5)
CHLORIDE SERPL-SCNC: 107 MMOL/L (ref 95–110)
CHOLEST SERPL-MCNC: 187 MG/DL (ref 120–199)
CHOLEST/HDLC SERPL: 3.7 {RATIO} (ref 2–5)
CO2 SERPL-SCNC: 23 MMOL/L (ref 23–29)
CREAT SERPL-MCNC: 0.7 MG/DL (ref 0.5–1.4)
EST. GFR  (NO RACE VARIABLE): >60 ML/MIN/1.73 M^2
GLUCOSE SERPL-MCNC: 83 MG/DL (ref 70–110)
HDLC SERPL-MCNC: 51 MG/DL (ref 40–75)
HDLC SERPL: 27.3 % (ref 20–50)
LDLC SERPL CALC-MCNC: 124 MG/DL (ref 63–159)
NONHDLC SERPL-MCNC: 136 MG/DL
POTASSIUM SERPL-SCNC: 4.4 MMOL/L (ref 3.5–5.1)
PROT SERPL-MCNC: 6.8 G/DL (ref 6–8.4)
SODIUM SERPL-SCNC: 142 MMOL/L (ref 136–145)
TRIGL SERPL-MCNC: 60 MG/DL (ref 30–150)

## 2023-05-11 PROCEDURE — 80053 COMPREHEN METABOLIC PANEL: CPT | Performed by: INTERNAL MEDICINE

## 2023-05-11 PROCEDURE — 80061 LIPID PANEL: CPT | Performed by: INTERNAL MEDICINE

## 2023-05-17 ENCOUNTER — OFFICE VISIT (OUTPATIENT)
Dept: CARDIOLOGY | Facility: CLINIC | Age: 63
End: 2023-05-17
Payer: COMMERCIAL

## 2023-05-17 VITALS
DIASTOLIC BLOOD PRESSURE: 72 MMHG | BODY MASS INDEX: 33.64 KG/M2 | SYSTOLIC BLOOD PRESSURE: 132 MMHG | WEIGHT: 197.06 LBS | HEIGHT: 64 IN | HEART RATE: 72 BPM

## 2023-05-17 DIAGNOSIS — R03.0 SITUATIONAL HYPERTENSION: ICD-10-CM

## 2023-05-17 DIAGNOSIS — E66.9 OBESITY, CLASS I, BMI 30-34.9: Chronic | ICD-10-CM

## 2023-05-17 DIAGNOSIS — E78.00 HYPERCHOLESTEROLEMIA: Chronic | ICD-10-CM

## 2023-05-17 DIAGNOSIS — T50.905A DRUG SIDE EFFECTS, INITIAL ENCOUNTER: ICD-10-CM

## 2023-05-17 DIAGNOSIS — Z82.49 FAMILY HISTORY OF EARLY CAD: Chronic | ICD-10-CM

## 2023-05-17 DIAGNOSIS — I34.0 NONRHEUMATIC MITRAL VALVE REGURGITATION: Primary | Chronic | ICD-10-CM

## 2023-05-17 PROBLEM — R09.89 RIGHT CAROTID BRUIT: Status: RESOLVED | Noted: 2022-09-21 | Resolved: 2023-05-17

## 2023-05-17 PROCEDURE — 3075F PR MOST RECENT SYSTOLIC BLOOD PRESS GE 130-139MM HG: ICD-10-PCS | Mod: CPTII,S$GLB,, | Performed by: INTERNAL MEDICINE

## 2023-05-17 PROCEDURE — 1159F PR MEDICATION LIST DOCUMENTED IN MEDICAL RECORD: ICD-10-PCS | Mod: CPTII,S$GLB,, | Performed by: INTERNAL MEDICINE

## 2023-05-17 PROCEDURE — 3078F DIAST BP <80 MM HG: CPT | Mod: CPTII,S$GLB,, | Performed by: INTERNAL MEDICINE

## 2023-05-17 PROCEDURE — 3008F BODY MASS INDEX DOCD: CPT | Mod: CPTII,S$GLB,, | Performed by: INTERNAL MEDICINE

## 2023-05-17 PROCEDURE — 3078F PR MOST RECENT DIASTOLIC BLOOD PRESSURE < 80 MM HG: ICD-10-PCS | Mod: CPTII,S$GLB,, | Performed by: INTERNAL MEDICINE

## 2023-05-17 PROCEDURE — 99214 PR OFFICE/OUTPT VISIT, EST, LEVL IV, 30-39 MIN: ICD-10-PCS | Mod: S$GLB,,, | Performed by: INTERNAL MEDICINE

## 2023-05-17 PROCEDURE — 3075F SYST BP GE 130 - 139MM HG: CPT | Mod: CPTII,S$GLB,, | Performed by: INTERNAL MEDICINE

## 2023-05-17 PROCEDURE — 3008F PR BODY MASS INDEX (BMI) DOCUMENTED: ICD-10-PCS | Mod: CPTII,S$GLB,, | Performed by: INTERNAL MEDICINE

## 2023-05-17 PROCEDURE — 1159F MED LIST DOCD IN RCRD: CPT | Mod: CPTII,S$GLB,, | Performed by: INTERNAL MEDICINE

## 2023-05-17 PROCEDURE — 99214 OFFICE O/P EST MOD 30 MIN: CPT | Mod: S$GLB,,, | Performed by: INTERNAL MEDICINE

## 2023-05-17 RX ORDER — EZETIMIBE 10 MG/1
10 TABLET ORAL DAILY
Qty: 90 TABLET | Refills: 1 | Status: SHIPPED | OUTPATIENT
Start: 2023-05-17 | End: 2023-06-01

## 2023-05-17 NOTE — PROGRESS NOTES
Subjective:    Patient ID:  Vivi Dawson is a 62 y.o. female who presents for Hyperlipidemia and Follow-up        Hyperlipidemia  Pertinent negatives include no chest pain, focal weakness or shortness of breath.   Follow-up  Pertinent negatives include no abdominal pain, change in bowel habit, chest pain, chills, congestion, coughing, diaphoresis, fever, nausea, vomiting or weakness.     DISCUSSED LABS AND GOALS CMP WNL, , HDL 51, TG 60, NOT TAKING PRAVACHOL DAILY B/O DIARRHEA AND ABD PAIN, NO CHEST PAIN NO SHORTNESS OF BREATH NO TIA TYPE SYMPTOMS NO NEAR-SYNCOPE, SEE REVIEW OF SYSTEMS  Past Medical History:   Diagnosis Date    Depression with anxiety 5/9/2019    Depression with anxiety     Gastroesophageal reflux disease 5/9/2019    Hypercholesterolemia 9/21/2022    Situational hypertension 5/17/2023    Systolic murmur 9/21/2022     Past Surgical History:   Procedure Laterality Date    ANKLE FRACTURE SURGERY Right 12/26/2007    ORIF right ankle    CYST REMOVAL Right 07/2019    cyst removed right thigh    ESOPHAGOGASTRODUODENOSCOPY N/A 7/12/2022    Procedure: EGD (ESOPHAGOGASTRODUODENOSCOPY);  Surgeon: KB Tellez MD;  Location: [facility];  Service: Endoscopy;  Laterality: N/A;     Family History   Problem Relation Age of Onset    Cancer Father      Social History     Socioeconomic History    Marital status:    Tobacco Use    Smoking status: Never    Smokeless tobacco: Never   Substance and Sexual Activity    Alcohol use: Yes     Alcohol/week: 1.0 standard drink     Types: 1 Glasses of wine per week    Drug use: No    Sexual activity: Yes     Birth control/protection: Post-menopausal     Social Determinants of Health     Financial Resource Strain: Low Risk     Difficulty of Paying Living Expenses: Not very hard   Food Insecurity: No Food Insecurity    Worried About Running Out of Food in the Last Year: Never true    Ran Out of Food in the Last Year: Never true   Transportation Needs: No  Transportation Needs    Lack of Transportation (Medical): No    Lack of Transportation (Non-Medical): No   Physical Activity: Inactive    Days of Exercise per Week: 0 days    Minutes of Exercise per Session: 0 min   Stress: Stress Concern Present    Feeling of Stress : Rather much   Social Connections: Unknown    Frequency of Communication with Friends and Family: More than three times a week    Frequency of Social Gatherings with Friends and Family: Once a week    Active Member of Clubs or Organizations: Yes    Attends Club or Organization Meetings: More than 4 times per year    Marital Status:    Housing Stability: Low Risk     Unable to Pay for Housing in the Last Year: No    Number of Places Lived in the Last Year: 1    Unstable Housing in the Last Year: No       Review of patient's allergies indicates:   Allergen Reactions    Codeine Nausea And Vomiting       Current Outpatient Medications:     ondansetron (ZOFRAN-ODT) 8 MG TbDL, Take 1 tablet (8 mg total) by mouth every 24 hours as needed (nausea)., Disp: 20 tablet, Rfl: 0    pantoprazole (PROTONIX) 40 MG tablet, TAKE 1 TABLET BY MOUTH TWICE A DAY BEFORE BREAKFAST & DINNER, Disp: , Rfl:     ezetimibe (ZETIA) 10 mg tablet, Take 1 tablet (10 mg total) by mouth once daily., Disp: 90 tablet, Rfl: 1    Review of Systems   Constitutional: Negative for chills, diaphoresis, fever, malaise/fatigue, night sweats and weight gain.   HENT:  Negative for congestion and nosebleeds.    Eyes:  Negative for blurred vision and visual disturbance.   Cardiovascular:  Positive for dyspnea on exertion (MILD STAIRS). Negative for chest pain, claudication, cyanosis, irregular heartbeat, leg swelling, near-syncope, orthopnea, palpitations, paroxysmal nocturnal dyspnea and syncope.   Respiratory:  Negative for cough, hemoptysis, shortness of breath and wheezing.    Endocrine: Negative for polydipsia and polyuria.   Hematologic/Lymphatic: Negative for adenopathy. Does not  "bruise/bleed easily.   Skin:  Negative for color change and itching.   Musculoskeletal:  Negative for back pain, falls and joint pain (KNEES).   Gastrointestinal:  Positive for diarrhea. Negative for abdominal pain, change in bowel habit, dysphagia, heartburn (HH), melena, nausea and vomiting.   Genitourinary:  Negative for dysuria and flank pain.   Neurological:  Negative for brief paralysis, dizziness, focal weakness, light-headedness, loss of balance and weakness.   Psychiatric/Behavioral:  Negative for altered mental status, depression and memory loss.       Objective:      Vitals:    05/17/23 1124 05/17/23 1141   BP: (!) 158/77 132/72   Pulse: 72    Weight: 89.4 kg (197 lb 1.5 oz)    Height: 5' 4" (1.626 m)    PainSc: 0-No pain      Body mass index is 33.83 kg/m².    Physical Exam  Constitutional:       Appearance: She is well-developed. She is obese.   HENT:      Head: Normocephalic and atraumatic.   Eyes:      Extraocular Movements: Extraocular movements intact.      Conjunctiva/sclera: Conjunctivae normal.   Neck:      Vascular: Normal carotid pulses. No carotid bruit or JVD.   Cardiovascular:      Rate and Rhythm: Normal rate and regular rhythm. No extrasystoles are present.     Pulses:           Carotid pulses are 2+ on the right side and 2+ on the left side.       Radial pulses are 2+ on the right side and 2+ on the left side.        Posterior tibial pulses are 2+ on the right side and 2+ on the left side.      Heart sounds: Murmur heard.   Systolic murmur is present with a grade of 1/6 at the lower left sternal border.     No friction rub. No gallop.   Pulmonary:      Effort: Pulmonary effort is normal.      Breath sounds: Normal breath sounds and air entry. No rales.   Abdominal:      General: Bowel sounds are normal.      Palpations: Abdomen is soft.      Tenderness: There is no abdominal tenderness.   Musculoskeletal:         General: Normal range of motion.      Cervical back: Neck supple.      Right " lower leg: No edema.      Left lower leg: No edema.   Skin:     General: Skin is warm and dry.      Capillary Refill: Capillary refill takes less than 2 seconds.   Neurological:      General: No focal deficit present.      Mental Status: She is alert and oriented to person, place, and time.      Cranial Nerves: No cranial nerve deficit.      Motor: No tremor or abnormal muscle tone.   Psychiatric:         Mood and Affect: Mood normal.         Speech: Speech normal.         Behavior: Behavior normal.               ..    Chemistry        Component Value Date/Time     05/11/2023 1048    K 4.4 05/11/2023 1048     05/11/2023 1048    CO2 23 05/11/2023 1048    BUN 12 05/11/2023 1048    CREATININE 0.7 05/11/2023 1048    GLU 83 05/11/2023 1048        Component Value Date/Time    CALCIUM 9.8 05/11/2023 1048    ALKPHOS 88 05/11/2023 1048    AST 16 05/11/2023 1048    ALT 15 05/11/2023 1048    BILITOT 0.4 05/11/2023 1048    ESTGFRAFRICA >60 06/16/2022 1025    EGFRNONAA >60 06/16/2022 1025            ..  Lab Results   Component Value Date    CHOL 187 05/11/2023    CHOL 190 06/16/2022    CHOL 181 08/24/2020     Lab Results   Component Value Date    HDL 51 05/11/2023    HDL 51 06/16/2022    HDL 59 08/24/2020     Lab Results   Component Value Date    LDLCALC 124.0 05/11/2023    LDLCALC 124.2 06/16/2022    LDLCALC 109.4 08/24/2020     Lab Results   Component Value Date    TRIG 60 05/11/2023    TRIG 74 06/16/2022    TRIG 63 08/24/2020     Lab Results   Component Value Date    CHOLHDL 27.3 05/11/2023    CHOLHDL 26.8 06/16/2022    CHOLHDL 32.6 08/24/2020     ..  Lab Results   Component Value Date    WBC 4.93 08/24/2020    HGB 13.8 08/24/2020    HCT 41.8 08/24/2020    MCV 89 08/24/2020     08/24/2020       Test(s) Reviewed  I have reviewed the following in detail:  [] Stress test   [] Angiography   [] Echocardiogram   [x] Labs   [] Other:       Assessment:         ICD-10-CM ICD-9-CM   1. Nonrheumatic mitral valve  regurgitation  I34.0 424.0   2. Drug side effects, initial encounter  T50.905A E947.9   3. Situational hypertension  R03.0 796.2   4. Hypercholesterolemia  E78.00 272.0   5. Family history of early CAD  Z82.49 V17.3   6. Obesity, Class I, BMI 30-34.9  E66.9 278.00     Problem List Items Addressed This Visit          Cardiac/Vascular    Hypercholesterolemia    Family history of early CAD    Nonrheumatic mitral valve regurgitation - Primary    Situational hypertension       Endocrine    Obesity, Class I, BMI 30-34.9       Other    Drug side effects, initial encounter        Plan:         WATCH BP, CHANGE PRAVACHOL TO ZETIA, BECAUSE OF SIDE EFFECTS,ALL CV CLINICALLY STABLE, NO ANGINA, NO HF, NO TIA, NO CLINICAL ARRHYTHMIA,CONTINUE CURRENT MEDS, EDUCATION, DIET, EXERCISE , WEIGHT LOSS RETURN TO CLINIC IN 6 MO  Nonrheumatic mitral valve regurgitation    Drug side effects, initial encounter    Situational hypertension  Comments:  MONITOR MIGHT NEED TO BE TREATED    Hypercholesterolemia    Family history of early CAD    Obesity, Class I, BMI 30-34.9    Other orders  -     ezetimibe (ZETIA) 10 mg tablet; Take 1 tablet (10 mg total) by mouth once daily.  Dispense: 90 tablet; Refill: 1    RTC Low level/low impact aerobic exercise 5x's/wk. Heart healthy diet and risk factor modification.    See labs and med orders.    Aerobic exercise 5x's/wk. Heart healthy diet and risk factor modification.    See labs and med orders.

## 2023-06-01 PROBLEM — K44.9 HIATAL HERNIA: Status: ACTIVE | Noted: 2023-06-01
